# Patient Record
Sex: FEMALE | Race: WHITE
[De-identification: names, ages, dates, MRNs, and addresses within clinical notes are randomized per-mention and may not be internally consistent; named-entity substitution may affect disease eponyms.]

---

## 2018-06-03 ENCOUNTER — HOSPITAL ENCOUNTER (EMERGENCY)
Dept: HOSPITAL 80 - CED | Age: 23
Discharge: HOME | End: 2018-06-03
Payer: COMMERCIAL

## 2018-06-03 VITALS — DIASTOLIC BLOOD PRESSURE: 64 MMHG | SYSTOLIC BLOOD PRESSURE: 114 MMHG

## 2018-06-03 DIAGNOSIS — R10.10: Primary | ICD-10-CM

## 2018-06-03 LAB — PLATELET # BLD: 367 10^3/UL (ref 150–400)

## 2018-06-03 NOTE — EDPHY
H & P


Stated Complaint: upper abd pain for 1 month, increased last week.


Time Seen by Provider: 06/03/18 16:36


HPI/ROS: 





CHIEF COMPLAINT:  Abdominal pain for 1 month 





HISTORY OF PRESENT ILLNESS:  This is a previously healthy 23-year-old female 

who has had abdominal pain without weight loss for approximately 1 month.  She 

does recall having an episode of diarrheal illness in the mid April a time 

frame which lasted 1/2 a day, associated with several trips to the bathroom for 

liquid stool without blood and a single episode of emesis.  For some 2 days 

later she felt run down and after 3 days was fine.





Beginning approximately 1 month ago she started having abdominal pain.  This is 

a daily basis occurring after meals some 20-30 minutes.  It would not go away.  

There was some associated irritations but no actual flatulence.  There was no 

bloating.





Some 2 weeks ago, he was seen by her PCP who recommended a trial of lactose 

free.  It took her several days to by into that as she really did not want to 

adjust her diet.  However, 1 episode was particularly bad so much so that she 

was a little sweaty and thus she tried a lactose-free.  She was much better did 

that time frame.





Approximately 10 to for 12 days ago she started resuming dairy products in her 

diet with the use of lactate.  She would take the lactate at the time of the 

meal.  There was distinct improvement.  Though somewhat milder pain.





Today's visit is prompted by an episode of the pain, just the bed before, and 

identical as well, in the setting of eating a chicken taco without any dairy 

associated with it whatsoever.  This occurred yesterday about 1:00 p.m..  She 

was improved some 5-6 hours later.  Today, the pain me occurred at 

approximately 1:00 p.m. After having a breakfast of sausage and eggs without 

any dairy type products at 11:30 a.m.  The pain is somewhat better at this 

point in time but still there.





She would like to pursue additional diagnostic testing at this time.





She has had no weight loss.  There has been no extra fell gas per the rectum 

nor flatulence.





There has been no team due to food or water sources including kayaking and 

hiking relating to bed water sources.  No one else is sick.  No travel outside 

the country.





P:  Starts 20 min after eating food, lasting several hours


Q:  Achiness throughout the upper abdomen and supraumbilical area


R:  Does not radiate to the back or chest or shoulders


S:  Moderate, will keep her awake on occasion


T:  Onset approximately 1 month ago.  Transiently better on a strict lactose-

free diet.





Travel:                 None


Others:                None


Antibiotics:          None


Bad Food:            None


Bad Water:           None


Recent Surgery:   None





REVIEW OF SYSTEMS:


Constitutional:  No fever, no chills.


Eyes:  No discharge No diplopia


ENT:  No sore throat.


Cardiovascular:  No chest pain, no palpitations.


Respiratory:  No cough, shortness of breath, or wheezing.


Gastrointestinal:  No nausea vomiting or diarrhea.  No abdominal pain.


Genitourinary:  No hematuria or frequency.


Musculoskeletal:  No back pain.


Skin:  No rashes.


Neurological:  No headache.





10 point ROS otherwise negative





Source: Patient


Exam Limitations: No limitations





- Personal History


LMP (Females 10-55): 1-7 Days Ago





- Medical/Surgical History


Hx Asthma: No


Hx Chronic Respiratory Disease: No


Hx Diabetes: No


Hx Cardiac Disease: No


Hx Renal Disease: No


Hx Cirrhosis: No


Hx Alcoholism: No


Hx HIV/AIDS: No


Hx Splenectomy or Spleen Trauma: No


Other PMH: Laparoscopic egg donation several years ago





- Family History


Significant Family History: No pertinent family hx





- Social History


Smoking Status: Never smoked


Alcohol Use: None


Drug Use: None





- Physical Exam


Exam: 





General Appearance:  Alert, no distress.  Afebrile.  Normal phonation.  No 

respiratory distress.  Nontoxic, no diaphoresis


Eyes:  Pupils equal and round no pallor or injection.   No icterus


ENT, Mouth:  Mucous membranes moist   Pharynx without erythema or exudate.  TM 

Clear.  


Neck:  No adenopathy.  Supple.  No JVD.  Trachea in midline.


Respiratory:  There are no retractions, lungs are clear to auscultation.


Chest wall:  Nontender to palpation. No crepitus.


Cardiovascular:  Regular rate and rhythm, no murmur.  


Abdomen:  Soft diffuse tenderness all 4 quadrants however greatest in the left 

upper quadrant.  No masses, bowel sounds normal.  Femoral pulses equal.  No CVA 

tenderness


Neurological:  Ox3.  No motor weakness.  Sensation intact.  Gait nl.


Skin:  Warm and dry, no rashes.  


Musculoskeletal:  No joint swelling.


Extremities:  No edema. Homans sign negative.  No cords.


Psychiatric:  Normal affect.    There is no agitation


Constitutional: 


 Initial Vital Signs











Temperature (C)  36.7 C   06/03/18 16:31


 


Heart Rate  65   06/03/18 16:31


 


Respiratory Rate  18   06/03/18 16:31


 


Blood Pressure  126/70 H  06/03/18 16:31


 


O2 Sat (%)  98   06/03/18 16:31








 











O2 Delivery Mode               Room Air














Allergies/Adverse Reactions: 


 





No Known Allergies Allergy (Unverified 06/03/18 16:30)


 








Home Medications: 














 Medication  Instructions  Recorded


 


Sprintec 28 Day Tablet  06/03/18














Medical Decision Making





- Diagnostics


Imaging Results: 


 Imaging Impressions





Abdomen X-Ray  06/03/18 17:19


Impression: 


1. Mild constipation.


2. No bowel obstruction.











ED Course/Re-evaluation: 





Patient was observed for period of time.  There is really no improvement of her 

pain.  Ultimately she received some Tylenol.  We talked about difficulties with 

generic pain medicine such as Vicodin or Percocet as might aggravating her 

pain.  She certainly did not request any.





This presents a perplexing problem, however is due for an outpatient workup.  

She has had a protracted difficulties for the course of the last 4 weeks.  She 

is having minimal bloating and no flatulence thus the idea of lactose 

intolerance somewhat less inclined.  Furthermore as she is  that would 

be another reason to make this less likely.  There is only moderate stool seen 

on the x-ray, she has had some decent response to over-the-counter lactaid, and 

has not tried a PPI.  Thus there is a possibility that several different things 

still exist such as acid peptic disease, lactose intolerance (which is less 

likely as noted above), as well as functional bowel disorder and constipation.  

She did take a tablet of the medications given to her by her PCP yesterday and 

today with not a whole lot of relief.  She has yet to try any Tylenol.  She has 

not been taking ibuprofen





She has already been keeping it dietary log which we I have encouraged.  As to 

her choice of Lactaid I would prefer her to try Lactease, as evidently it seems 

to have a more efficacy based upon a studies, which I shared with her.  She 

will follow up as she has been planning to do so with her family doctor in the 

next week or so.  It is encouraging that she has not lost weight on this 

problem.  Giardiasis certainly would be a unifying diagnosis as it might well 

lead to lactose intolerance however she has had no particular vector of 

exposure and has had really no flatulence to speak of that would be usually 

seen in giardiasis.  She will submit a stool specimen, if able, later in the 

week.


Differential Diagnosis: 





Differential diagnosis includes, but is not limited to: 


Gastroenteritis, renal colic, kidney stones, ureterolithiasis, cholecystitis, 

appendicitis, gastritis, mesenteric adenitis, food poisoning, bacterial 

dysentery, giardiasis, celiac sprue, lactose deficiency.








- Data Points


Laboratory Results: 


 Laboratory Results





 06/03/18 17:30 





 











  06/03/18





  17:30


 


WBC  12.88 10^3/uL H 10^3/uL





   (3.80-9.50) 


 


RBC  4.71 10^6/uL 10^6/uL





   (4.18-5.33) 


 


Hgb  14.3 g/dL g/dL





   (12.6-16.3) 


 


Hct  42.3 % %





   (38.0-47.0) 


 


MCV  89.8 fL fL





   (81.5-99.8) 


 


MCH  30.4 pg pg





   (27.9-34.1) 


 


MCHC  33.8 g/dL g/dL





   (32.4-36.7) 


 


RDW  12.9 % %





   (11.5-15.2) 


 


Plt Count  367 10^3/uL 10^3/uL





   (150-400) 


 


MPV  10.2 fL fL





   (8.7-11.7) 


 


Neut % (Auto)  51.2 % %





   (39.3-74.2) 


 


Lymph % (Auto)  27.9 % %





   (15.0-45.0) 


 


Mono % (Auto)  5.1 % %





   (4.5-13.0) 


 


Eos % (Auto)  14.4 % H %





   (0.6-7.6) 


 


Baso % (Auto)  1.2 % %





   (0.3-1.7) 


 


Nucleat RBC Rel Count  0.0 % %





   (0.0-0.2) 


 


Absolute Neuts (auto)  6.60 10^3/uL H 10^3/uL





   (1.70-6.50) 


 


Absolute Lymphs (auto)  3.59 10^3/uL H 10^3/uL





   (1.00-3.00) 


 


Absolute Monos (auto)  0.66 10^3/uL 10^3/uL





   (0.30-0.80) 


 


Absolute Eos (auto)  1.85 10^3/uL H 10^3/uL





   (0.03-0.40) 


 


Absolute Basos (auto)  0.15 10^3/uL H 10^3/uL





   (0.02-0.10) 


 


Absolute Nucleated RBC  0.00 10^3/uL 10^3/uL





   (0-0.01) 


 


Immature Gran %  0.2 % %





   (0.0-1.1) 


 


Immature Gran #  0.03 10^3/uL 10^3/uL





   (0.00-0.10) 


 


Smear Review By  Pending 





  











Medications Given: 


 








Discontinued Medications





Acetaminophen (Tylenol)  1,000 mg PO EDNOW ONE


   Stop: 06/03/18 19:07


   Last Admin: 06/03/18 19:16 Dose:  1,000 mg





Point of Care Test Results: 


 Urine Dip











Collection Date                06/03/18


 


Collection Time                17:36


 


Specific Gravity (1.002-1.030) 1.020


 


PH (5.0-7.5)                   8.5


 


Leukocytes (Negative)          Trace


 


Nitrites (Negative)            Negative


 


Protein (Negative)             Negative


 


Glucose (Negative)             Negative


 


Ketones (Negative)             Negative


 


Urobilnogen (0.2-1.0 EU)       4.0


 


Bilirubin (Negative)           Negative


 


Blood (Negative)               Negative

















Departure





- Departure


Disposition: Home, Routine, Self-Care


Clinical Impression: 


Abdominal pain


Qualifiers:


 Abdominal location: upper abdomen, unspecified Qualified Code(s): R10.10 - 

Upper abdominal pain, unspecified





Condition: Good


Instructions:  Lactose-Controlled Diet (ED), Acute Abdominal Pain (ED), Gas and 

Bloating (ED)


Additional Instructions: 


First, strict dairy free for 7 days.


Them lactrase is probably your best bet.  Dairyease is another alternative.  

Lactaid is a distant third.





For the pain:  Tylenol





Therapeutic Trials (no need to combine)


- Dairy Free


- Prilosec OTC 20 mg, 2 tabs daily for 10 days


- Miralax daily for 4 weeks.





If you want to try to get Ca++ in your diet:


try for Ca++ supplemnet of > 500 mg per day


or 


Yogurt with live cultures, though without milk added after the fermentation.





Consider submitting a stool specimen later in the week, in particular if the 

Lactose Free diet does not work.





Follow up the regular doctor in 5-10 days.





Submit stools when able to do so;  Take to [facility] for processing.





Referrals: 


Jessica Howell MD [Primary Care Provider] - As per Instructions

## 2018-06-05 ENCOUNTER — HOSPITAL ENCOUNTER (EMERGENCY)
Dept: HOSPITAL 80 - FED | Age: 23
Discharge: HOME | End: 2018-06-05
Payer: COMMERCIAL

## 2018-06-05 VITALS — DIASTOLIC BLOOD PRESSURE: 53 MMHG | SYSTOLIC BLOOD PRESSURE: 108 MMHG

## 2018-06-05 DIAGNOSIS — R10.13: Primary | ICD-10-CM

## 2018-06-05 LAB — PLATELET # BLD: 316 10^3/UL (ref 150–400)

## 2018-06-05 NOTE — EDPHY
HPI/HX/ROS/PE/MDM


Narrative: 


CHIEF COMPLAINT: Upper abdominal pain





HPI: 


The patient is a 22 y/o female complaining of upper abdominal pain onset around 

1 month ago. Just prior to her onset of symptoms, she thought she had a stomach 

illness as she vomited and had diarrhea. However, within several days these 

symptoms improved; although she has had looser stools than normal since then. 

When her abdominal pain began she saw her PCP, Dr. Howell, who thought that 

the patient might have a lactose intolerance. Her PCP advised that the patient 

stop consuming dairy and take Lactaid, Tums, and Bentyl. However, these 

medications have not improved her symptoms and she did not stop eating dairy. 

On Sunday, 2 days ago, the pain exacerbated so she decided to present to the 

emergency department. During this visit she had normal labs and abdominal x-

rays. The physician at this time advised that she follow up with a 

gastroenterologist, stop eating dairy, take Prilosec and Miralax. For the past 

2 days the patient has not consumed dairy, but the pain has worsened. Due to 

this pain she has been unable to work. Today she called her PCP, who advised 

that she present to the ED again. The pain is currently in her mid-upper 

abdomen. Applying ice mildly alleviates the pain; she last took Tylenol today 

with minimal relief. Denies tests to look at her gallbladder or familial 

history of gallbladder problems. Denies vomiting, urinary or bowel complaints, 

fever, headache, numbness, paresthesias, chest pain, shortness of breath.





REVIEW OF SYSTEMS:


Aside from elements discussed in the HPI, a comprehensive 10-point review of 

systems was reviewed and is negative.





PMH: Denies





SOCIAL HISTORY: Mother at bedside, employed as an , lives in 

[place]





PHYSICAL EXAM:


General: Patient is alert, in no acute distress.


ENT: Eyes are normal to inspection.  ENT inspection normal.


Neck: Normal inspection.  Full range of motion.


Respiratory: No respiratory distress.  Breath sounds normal bilaterally.


Cardiovascular: Regular rate and rhythm.  Strong peripheral pulses.  Normal cap 

refill.


Abdomen: Severe epigastric tenderness to palpation. There are no peritoneal 

signs. There are normal bowel sounds.


Back: Normal to inspection.  No tenderness to palpation.


Skin: Normal color.  No rash.  Warm and dry.


Extremities: Normal appearance.  Full range of motion.


Neuro: Oriented x3.  Normal motor function.  Normal sensory function.





ED Course: 





1745: I spoke with Dr. Beltran, radiologist, who reports that the patient's 

gallbladder US is negative.





1800: Reassessed patient and discussed imaging and laboratory findings. She is 

feeling okay after 30mg IV Toradol. She would like an abdominopelvic CT, which 

I feel is appropriate.





1925: Spoke with Dr. Beltran, radiologist, who reports that patient's 

abdominopelvic CT is negative.





1927: Reassessed patient and discussed imaging findings. 40mg PO Protonix given 

prior to discharge. I have advised her to follow up with Dr. Portillo, 

gastroenterologist, in the next week. I have also advised her to take Prilosec 

OTC for the next 10 days. Return precautions provided; patient is comfortable 

with this plan.





MDM: 





This is a young healthy female that presents with severe pain after eating, 

progressively getting worse.  We performed an extensive evaluation including 

labs, US and CT, all of which are negative.  Given workup, I suspect this 

likely represents gastritis or possible ulcer - patient will be started on PPI 

and given urgent GI referral.  I see no evidence of appendicitis, cholecystitis

, pancreatitis, PNA or UTI.





- Data Points


Imaging Results: 


 Imaging Impressions





Abdomen Ultrasound  06/05/18 16:35


Impression: Normal right upper quadrant ultrasound.


 


Findings discussed with Isidro Arvizu MD 6/5/2018 at 17:45.  











Imaging: Discussed imaging studies w/ On call Radiologist, I viewed and 

interpreted images myself


Laboratory Results: 


 Laboratory Results





 06/05/18 16:10 





 06/05/18 16:10 





 











  06/05/18 06/05/18 06/05/18





  16:10 16:10 16:10


 


WBC      10.41 10^3/uL H 10^3/uL





     (3.80-9.50) 


 


RBC      4.51 10^6/uL 10^6/uL





     (4.18-5.33) 


 


Hgb      13.9 g/dL g/dL





     (12.6-16.3) 


 


Hct      40.5 % %





     (38.0-47.0) 


 


MCV      89.8 fL fL





     (81.5-99.8) 


 


MCH      30.8 pg pg





     (27.9-34.1) 


 


MCHC      34.3 g/dL g/dL





     (32.4-36.7) 


 


RDW      12.8 % %





     (11.5-15.2) 


 


Plt Count      316 10^3/uL 10^3/uL





     (150-400) 


 


MPV      10.0 fL fL





     (8.7-11.7) 


 


Neut % (Auto)      64.9 % %





     (39.3-74.2) 


 


Lymph % (Auto)      26.6 % %





     (15.0-45.0) 


 


Mono % (Auto)      6.4 % %





     (4.5-13.0) 


 


Eos % (Auto)      0.8 % %





     (0.6-7.6) 


 


Baso % (Auto)      1.0 % %





     (0.3-1.7) 


 


Nucleat RBC Rel Count      0.0 % %





     (0.0-0.2) 


 


Absolute Neuts (auto)      6.76 10^3/uL H 10^3/uL





     (1.70-6.50) 


 


Absolute Lymphs (auto)      2.77 10^3/uL 10^3/uL





     (1.00-3.00) 


 


Absolute Monos (auto)      0.67 10^3/uL 10^3/uL





     (0.30-0.80) 


 


Absolute Eos (auto)      0.08 10^3/uL 10^3/uL





     (0.03-0.40) 


 


Absolute Basos (auto)      0.10 10^3/uL 10^3/uL





     (0.02-0.10) 


 


Absolute Nucleated RBC      0.00 10^3/uL 10^3/uL





     (0-0.01) 


 


Immature Gran %      0.3 % %





     (0.0-1.1) 


 


Immature Gran #      0.03 10^3/uL 10^3/uL





     (0.00-0.10) 


 


Sodium    140 mEq/L mEq/L  





    (135-145)  


 


Potassium    4.5 mEq/L mEq/L  





    (3.3-5.0)  


 


Chloride    106 mEq/L mEq/L  





    ()  


 


Carbon Dioxide    23 mEq/l mEq/l  





    (22-31)  


 


Anion Gap    11 mEq/L mEq/L  





    (8-16)  


 


BUN    14 mg/dL mg/dL  





    (7-23)  


 


Creatinine    0.8 mg/dL mg/dL  





    (0.6-1.0)  


 


Estimated GFR    > 60   





    


 


Glucose    96 mg/dL mg/dL  





    ()  


 


Calcium    8.7 mg/dL mg/dL  





    (8.5-10.4)  


 


Total Bilirubin    0.5 mg/dL mg/dL  





    (0.1-1.4)  


 


Conjugated Bilirubin    0.4 mg/dL mg/dL  





    (0.0-0.5)  


 


Unconjugated Bilirubin    0.1 mg/dL mg/dL  





    (0.0-1.1)  


 


AST    20 IU/L IU/L  





    (14-46)  


 


ALT    24 IU/L IU/L  





    (9-52)  


 


Alkaline Phosphatase    57 IU/L IU/L  





    ()  


 


Total Protein    6.5 g/dL g/dL  





    (6.3-8.2)  


 


Albumin    3.7 g/dL g/dL  





    (3.5-5.0)  


 


Lipase    57 IU/L IU/L  





    ()  


 


Beta HCG, Qual  NEGATIVE     





    











Medications Given: 


 








Discontinued Medications





Ketorolac Tromethamine (Toradol)  30 mg IVP EDNOW ONE


   Stop: 06/05/18 16:51


   Last Admin: 06/05/18 16:57 Dose:  30 mg








General


Time Seen by Provider: 06/05/18 16:02


Initial Vital Signs: 


 Initial Vital Signs











Temperature (C)  36.6 C   06/05/18 15:56


 


Heart Rate  71   06/05/18 15:56


 


Respiratory Rate  17   06/05/18 15:56


 


Blood Pressure  126/49 H  06/05/18 15:56


 


O2 Sat (%)  98   06/05/18 15:56








 











O2 Delivery Mode               Room Air














Allergies/Adverse Reactions: 


 





No Known Allergies Allergy (Verified 06/05/18 15:56)


 








Home Medications: 














 Medication  Instructions  Recorded


 


Sprintec 28 Day Tablet  06/03/18














Departure





- Departure


Disposition: Home, Routine, Self-Care


Clinical Impression: 


 Abdominal pain





Condition: Good


Instructions:  Acute Abdominal Pain (ED), Abdominal Pain (ED)


Additional Instructions: 


Take Prilosec OTC for the next 10 days as directed.


Follow up with a gastroenterologist in the next week, you have been referred to 

Dr. Portillo.


Follow-up with your primary doctor within 72 hours.  


Return to the Emergency Department for fever, chest pain, shortness of breath, 

increasing pain or other worsening of condition.





Referrals: 


Jessica Howell MD [Primary Care Provider] - As per Instructions


Miki Portillo MD [Medical Doctor] - As per Instructions


Report Scribed for: Isidro Arvizu


Report Scribed by: Shannan Landry


Date of Report: 06/05/18


Time of Report: 16:27


Physician Review and Approval Statement: Portions of this note were transcribed 

by an ED scribe.  I personally performed the history, physical exam, and 

medical decision making; and confirm the accuracy of the information in the 

transcribed note.